# Patient Record
Sex: FEMALE | Race: WHITE
[De-identification: names, ages, dates, MRNs, and addresses within clinical notes are randomized per-mention and may not be internally consistent; named-entity substitution may affect disease eponyms.]

---

## 2021-02-02 NOTE — EDM.PDOC
ED HPI GENERAL MEDICAL PROBLEM





- General


Chief Complaint: Back Pain or Injury


Stated Complaint: TALKED TO NURSE


Time Seen by Provider: 02/02/21 12:28


Source of Information: Reports: Patient


History Limitations: Reports: No Limitations





- History of Present Illness


INITIAL COMMENTS - FREE TEXT/NARRATIVE: 





Patient presents reporting low back pain.  The patient states that she has a 

history of many chronic medical problems.  She is deconditioned and mostly sits 

because she has low back and knee pain when she is on her feet.  3 days ago she 

was shopping when she had an exacerbation of her chronic back pain, some low 

abdominal pressure, urinary urgency and hot and cold sweats.  This is typical 

for her but was worse than normal and the symptoms have not subsided in the 

interim.  


  ** back


Pain Score (Numeric/FACES): 5





- Related Data


                                    Allergies











Allergy/AdvReac Type Severity Reaction Status Date / Time


 


Penicillins Allergy  Hives Verified 02/02/21 12:30


 


tramadol [From Ultram] Allergy  Stomach Verified 02/02/21 12:30





   Ache  














Past Medical History


Cardiovascular History: Reports: Hypertension


Psychiatric History: Reports: Depression


Endocrine/Metabolic History: Reports: Diabetes, Type II





- Infectious Disease History


Infectious Disease History: Reports: Chicken Pox, Measles





- Past Surgical History


HEENT Surgical History: Reports: Oral Surgery





Social & Family History





- Tobacco Use


Tobacco Use Status *Q: Never Tobacco User





- Recreational Drug Use


Recreational Drug Use: Yes


Recreational Drug Type: Reports: Marijuana/Hashish





ED ROS GENERAL





- Review of Systems


Review Of Systems: Comprehensive ROS is negative, except as noted in HPI.





ED EXAM,LOWER BACK PAIN/INJURY





- Physical Exam


Exam: See Below


Exam Limited By: No Limitations


General Appearance: Alert, No Apparent Distress


Ears: Normal External Exam


Nose: Normal Inspection


Throat/Mouth: Normal Inspection


Head: Atraumatic, Normocephalic


Neck: Normal Inspection, Supple, Non-Tender


Respiratory/Chest: No Respiratory Distress, Lungs Clear, Normal Breath Sounds


Cardiovascular: Normal Peripheral Pulses, Regular Rate, Rhythm, No Edema


GI/Abdominal: Soft, No Distention, Other (Mild left lower quadrant)


Back Exam: Normal Inspection, Full Range of Motion, Other (No SI joint 

tenderness).  No: CVA Tenderness (L), CVA Tenderness (R), Paraspinal Tenderness,

Vertebral Tenderness


Extremities: Normal Inspection, Normal Range of Motion


Neurological: Alert, Oriented x 3


Psychiatric: Tearful (She states she taylor easily)


Skin Exam: Warm, Dry, Intact, Normal Color, No Rash


Lymphatic: No Adenopathy





Course





- Vital Signs


Last Recorded V/S: 


                                Last Vital Signs











Temp  36.8 C   02/02/21 12:29


 


Pulse  128 H  02/02/21 12:29


 


Resp  23 H  02/02/21 12:29


 


BP  134/73   02/02/21 12:29


 


Pulse Ox  95   02/02/21 12:29














- Orders/Labs/Meds


Orders: 


                               Active Orders 24 hr











 Category Date Time Status


 


 CMP [COMPREHENSIVE METABOLIC PN,CMP] [CHEM] Stat Lab  02/02/21 12:54 Ordered











Labs: 


                                Laboratory Tests











  02/02/21 02/02/21 Range/Units





  12:52 13:35 


 


WBC   22.66 H  (4.0-11.0)  K/uL


 


RBC   4.68  (4.30-5.90)  M/uL


 


Hgb   14.3  (12.0-16.0)  g/dL


 


Hct   43.0  (36.0-46.0)  %


 


MCV   91.9  (80.0-98.0)  fL


 


MCH   30.6  (27.0-32.0)  pg


 


MCHC   33.3  (31.0-37.0)  g/dL


 


RDW Std Deviation   45.5  (28.0-62.0)  fl


 


RDW Coeff of Job   14  (11.0-15.0)  %


 


Plt Count   260  (150-400)  K/uL


 


MPV   10.80  (7.40-12.00)  fL


 


Neut % (Auto)   86.4 H  (48.0-80.0)  %


 


Lymph % (Auto)   6.5 L  (16.0-40.0)  %


 


Mono % (Auto)   6.9  (0.0-15.0)  %


 


Eos % (Auto)   0.1  (0.0-7.0)  %


 


Baso % (Auto)   0.1  (0.0-1.5)  %


 


Neut # (Auto)   19.6 H  (1.4-5.7)  K/uL


 


Lymph # (Auto)   1.5  (0.6-2.4)  K/uL


 


Mono # (Auto)   1.6 H  (0.0-0.8)  K/uL


 


Eos # (Auto)   0.0  (0.0-0.7)  K/uL


 


Baso # (Auto)   0.0  (0.0-0.1)  K/uL


 


Nucleated RBC %   0.0  /100WBC


 


Nucleated RBCs #   0  K/uL


 


Urine Color  YELLOW   


 


Urine Appearance  CLOUDY   


 


Urine pH  6.0   (5.0-8.0)  


 


Ur Specific Gravity  >= 1.030   (1.001-1.035)  


 


Urine Protein  100 H   (NEGATIVE)  mg/dL


 


Urine Glucose (UA)  NEGATIVE   (NEGATIVE)  mg/dL


 


Urine Ketones  TRACE H   (NEGATIVE)  mg/dL


 


Urine Occult Blood  MODERATE H   (NEGATIVE)  


 


Urine Nitrite  POSITIVE H   (NEGATIVE)  


 


Urine Bilirubin  NEGATIVE   (NEGATIVE)  


 


Urine Urobilinogen  0.2   (<2.0)  EU/dL


 


Ur Leukocyte Esterase  MODERATE H   (NEGATIVE)  


 


Urine RBC  3-5   (0-2/HPF)  


 


Urine WBC  35-40   (0-5/HPF)  


 


Ur Epithelial Cells  FEW   (NONE-FEW)  


 


Urine Bacteria  3+ H   (NEGATIVE)  











Meds: 


Medications














Discontinued Medications














Generic Name Dose Route Start Last Admin





  Trade Name Freq  PRN Reason Stop Dose Admin


 


Ketorolac Tromethamine  60 mg  02/02/21 14:12 





  Toradol  IM  02/02/21 14:13 





  ONETIME ONE  














- Re-Assessments/Exams


Free Text/Narrative Re-Assessment/Exam: 





02/02/21 14:17


And states that she had a CT scan of the abdomen 3 to 4 months ago that indicate

d she had a kidney stone "with horns".





Departure





- Departure


Time of Disposition: 14:18


Disposition: Home, Self-Care 01


Condition: Good


Clinical Impression: 


 Renal calculi, Adnexal mass








- Discharge Information


Instructions:  Kidney Stones, Easy-to-Read


Referrals: 


Conchis Skaggs DO [Ordering Only Provider] - 


Amaya Celestin DO [Primary Care Provider] - 


Forms:  ED Department Discharge


Additional Instructions: 


The following information is given to patients seen in the emergency department 

who are being discharged to home. This information is to outline your options 

for follow-up care. We provide all patients seen in our emergency department 

with a follow-up referral.





The need for follow-up, as well as the timing and circumstances, are variable 

depending upon the specifics of your emergency department visit.





If you don't have a primary care physician on staff, we will provide you with a 

referral. We always advise you to contact your personal physician following an 

emergency department visit to inform them of the circumstance of the visit and 

for follow-up with them and/or the need for any referrals to a consulting specia

list.





The emergency department will also refer you to a specialist when appropriate. 

This referral assures that you have the opportunity for follow-up care with a 

specialist. All of these measure are taken in an effort to provide you with 

optimal care, which includes your follow-up.





Under all circumstances we always encourage you to contact your private 

physician who remains a resource for coordinating your care. When calling for 

follow-up care, please make the office aware that this follow-up is from your 

recent emergency room visit. If for any reason you are refused follow-up, please

contact the [facility] Emergency Department

at [phone] and asked to speak to the emergency department charge nurse.





1.  Take your Flomax once daily


2.  Ibuprofen 2-3 tabs 3 times daily with food as needed for pain


3.  Follow-up with Dr. Skaggs, Urology to discuss kidney stone management.  This

office has been alerted that you need an appointment.


4.  Follow up in family practice to evaluate pelvic mass





Sepsis Event Note (ED)





- Evaluation


Sepsis Screening Result: No Definite Risk





- Focused Exam


Vital Signs: 


                                   Vital Signs











  Temp Pulse Resp BP Pulse Ox


 


 02/02/21 12:29  36.8 C  128 H  23 H  134/73  95














- My Orders


Last 24 Hours: 


My Active Orders





02/02/21 12:54


CMP [COMPREHENSIVE METABOLIC PN,CMP] [CHEM] Stat 














- Assessment/Plan


Last 24 Hours: 


My Active Orders





02/02/21 12:54


CMP [COMPREHENSIVE METABOLIC PN,CMP] [CHEM] Stat

## 2021-02-02 NOTE — CT
INDICATION:



Abdominal and back pain 



TECHNIQUE:



CT abdomen and pelvis acquired without IV contrast.



COMPARISON:



None 



FINDINGS:



Lower chest: Small hiatal hernia. 



Liver: Unremarkable.  



Spleen: Unremarkable.  



Pancreas: Unremarkable.  



Gallbladder and bile ducts: Unremarkable.  



Adrenal glands: Unremarkable.  



Kidneys: There are 2 mm stones adjacent to the left ureterovesical 

junction. It is difficult to determine if these are within or external to 

the ureter. There is no hydroureter but there is moderate hydronephrosis 

secondary to a 2.3 cm stone in the left renal pelvis. At least 3 smaller 

stones are also seen in the left kidney. There is mild fat stranding around 

the left kidney. There are 4 nonobstructive stones in the right kidney, the 

largest measuring 6 mm. 



GI tract: Unremarkable.  Appendix is normal.  



Vascular structures: Unremarkable.  



Lymph nodes: Unremarkable.  



Miscellaneous: Unremarkable.  No free air or significant free fluid.  



Pelvic Organs: 2.6 cm cystic lesion in the left adnexa. 



Bones: Unremarkable for age.  



IMPRESSION:



There are 2 mm stones adjacent to the left ureterovesical junction. It is 

difficult to determine if these are within or external to the ureter. There 

is no hydroureter but there is moderate hydronephrosis secondary to a 2.3 

cm stone in the left renal pelvis.



Additional stones are seen in both kidneys.



Cystic lesion in the left adnexa. If the patient is postmenopausal, a 

pelvic ultrasound is recommended for further characterization. 



Small hiatal hernia. 



These findings were discussed with Steff Grove NP at 1:53 p.m.  on 

February 2, 2021.



Please note that all CT scans at this facility use dose modulation, 

iterative reconstruction, and/or weight-based dosing when appropriate to 

reduce radiation dose to as low as reasonably achievable.



Dictated by Mikki Dias MD @ Feb 2 2021  1:56PM



Signed by Dr. Mikki Dias @ Feb 2 2021  1:56PM

## 2021-08-17 ENCOUNTER — HOSPITAL ENCOUNTER (EMERGENCY)
Dept: HOSPITAL 56 - MW.ED | Age: 51
Discharge: HOME | End: 2021-08-17
Payer: MEDICAID

## 2021-08-17 DIAGNOSIS — S89.92XA: Primary | ICD-10-CM

## 2021-08-17 DIAGNOSIS — M19.90: ICD-10-CM

## 2021-08-17 DIAGNOSIS — G89.29: ICD-10-CM

## 2021-08-17 DIAGNOSIS — E11.9: ICD-10-CM

## 2021-08-17 DIAGNOSIS — M54.5: ICD-10-CM

## 2021-08-17 DIAGNOSIS — I10: ICD-10-CM
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
## 2021-08-17 DIAGNOSIS — Z88.0: ICD-10-CM

## 2021-08-17 PROCEDURE — 73562 X-RAY EXAM OF KNEE 3: CPT

## 2021-08-17 PROCEDURE — 96372 THER/PROPH/DIAG INJ SC/IM: CPT

## 2021-08-17 PROCEDURE — 99283 EMERGENCY DEPT VISIT LOW MDM: CPT

## 2021-08-17 PROCEDURE — 72170 X-RAY EXAM OF PELVIS: CPT
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
## 2021-08-17 NOTE — CR
INDICATION: Patient fall, left knee and low back pelvis pain



TECHNIQUE: Pelvis radiograph 1 view 



COMPARISON: None



FINDINGS: 



Bone: No acute fractures or aggressive bone lesions are identified. 



Joint: The hip joints are unremarkable. The visualized sacroiliac joints 

are unremarkable in appearance. The pubic symphysis is normal in 

appearance. 



Soft tissue: Unremarkable. The visualized bowel gas pattern of the pelvis 

is unremarkable in appearance. No radiopaque foreign bodies are seen. 



IMPRESSION: 



1. No acute osseous injuries or abnormalities are noted. 



Dictated by: Tomas Foster MD @ 08/17/2021 19:34:25



(Electronically Signed)

## 2021-08-17 NOTE — CR
INDICATION: Patient fall, left knee and low back pain



TECHNIQUE: Knee radiograph 3 views left



COMPARISON: None



FINDINGS: 



Bone: No acute fractures or aggressive bone lesions are identified. 



Joint: The medial compartment has mild osteoarthritis. The joint spaces of 

the lateral and patellofemoral compartments are unremarkable. No 

significant knee effusion is seen. 



Soft tissue: Unremarkable. No radiopaque foreign bodies are seen. 



IMPRESSION: 



1. No acute osseous injuries or abnormalities are noted. 



Dictated by: Tomas Foster MD @ 08/17/2021 19:34:01



(Electronically Signed)

## 2022-06-07 ENCOUNTER — HOSPITAL ENCOUNTER (OUTPATIENT)
Dept: HOSPITAL 56 - MW.SDS | Age: 52
Discharge: HOME | End: 2022-06-07
Attending: SURGERY
Payer: MEDICAID

## 2022-06-07 DIAGNOSIS — I10: ICD-10-CM

## 2022-06-07 DIAGNOSIS — G47.30: ICD-10-CM

## 2022-06-07 DIAGNOSIS — M17.0: ICD-10-CM

## 2022-06-07 DIAGNOSIS — E11.9: ICD-10-CM

## 2022-06-07 DIAGNOSIS — K29.50: ICD-10-CM

## 2022-06-07 DIAGNOSIS — Z98.890: ICD-10-CM

## 2022-06-07 DIAGNOSIS — Z12.11: Primary | ICD-10-CM

## 2022-06-07 DIAGNOSIS — Z87.891: ICD-10-CM

## 2022-06-07 DIAGNOSIS — Z79.899: ICD-10-CM

## 2022-06-07 DIAGNOSIS — R13.10: ICD-10-CM

## 2022-06-07 DIAGNOSIS — E66.01: ICD-10-CM

## 2022-06-07 DIAGNOSIS — Z91.030: ICD-10-CM

## 2022-06-07 DIAGNOSIS — Z88.0: ICD-10-CM

## 2022-06-07 DIAGNOSIS — E78.00: ICD-10-CM

## 2022-06-07 DIAGNOSIS — K44.9: ICD-10-CM

## 2022-06-07 DIAGNOSIS — E78.5: ICD-10-CM

## 2022-06-07 DIAGNOSIS — K21.9: ICD-10-CM

## 2022-06-07 PROCEDURE — 43239 EGD BIOPSY SINGLE/MULTIPLE: CPT

## 2022-06-07 PROCEDURE — 45378 DIAGNOSTIC COLONOSCOPY: CPT

## 2022-06-07 PROCEDURE — 82947 ASSAY GLUCOSE BLOOD QUANT: CPT

## 2025-03-27 ENCOUNTER — HOSPITAL ENCOUNTER (EMERGENCY)
Dept: HOSPITAL 56 - MW.ED | Age: 55
LOS: 1 days | Discharge: HOME | End: 2025-03-28
Payer: COMMERCIAL

## 2025-03-27 DIAGNOSIS — Z75.8: ICD-10-CM

## 2025-03-27 DIAGNOSIS — N20.0: Primary | ICD-10-CM

## 2025-03-27 DIAGNOSIS — E66.9: ICD-10-CM

## 2025-03-27 DIAGNOSIS — Z79.899: ICD-10-CM

## 2025-03-27 DIAGNOSIS — E11.9: ICD-10-CM

## 2025-03-27 DIAGNOSIS — E78.00: ICD-10-CM

## 2025-03-27 DIAGNOSIS — K21.9: ICD-10-CM

## 2025-03-27 DIAGNOSIS — N30.01: ICD-10-CM

## 2025-03-27 DIAGNOSIS — I10: ICD-10-CM

## 2025-03-27 DIAGNOSIS — Z79.84: ICD-10-CM

## 2025-03-27 DIAGNOSIS — Z91.030: ICD-10-CM

## 2025-03-27 DIAGNOSIS — Z88.0: ICD-10-CM

## 2025-03-27 DIAGNOSIS — Z79.51: ICD-10-CM

## 2025-03-27 LAB
ALBUMIN SERPL-MCNC: 3.3 G/DL (ref 3.4–5)
ALBUMIN/GLOB SERPL: 0.8 {RATIO} (ref 0.9–1.6)
ALP SERPL-CCNC: 99 U/L (ref 46–116)
ALT SERPL-CCNC: 14 IU/L (ref 14–63)
APPEARANCE UR: (no result)
AST SERPL-CCNC: 13 IU/L (ref 15–37)
BACTERIA URNS QL MICRO: (no result)
BASOPHILS # BLD AUTO: 0.04 K/UL (ref 0–0.2)
BASOPHILS NFR BLD AUTO: 0.3 % (ref 0–1)
BILIRUB SERPL-MCNC: 0.3 MG/DL (ref 0.2–1)
BILIRUB UR STRIP-MCNC: NEGATIVE MG/DL
BUN SERPL-MCNC: 16 MG/DL (ref 7–18)
CALCIUM SERPL-MCNC: 9.3 MG/DL (ref 8.5–10.1)
CHLORIDE SERPL-SCNC: 101 MMOL/L (ref 98–107)
CO2 SERPL-SCNC: 26.6 MMOL/L (ref 21–32)
COLOR UR: YELLOW
CREAT CL 24H UR+SERPL-VRATE: 53.76 ML/MIN
CREAT SERPL-MCNC: 1.2 MG/DL (ref 0.6–1)
EGFRCR SERPLBLD CKD-EPI 2021: 53 ML/MIN (ref 60–?)
EOSINOPHIL # BLD AUTO: 0.24 K/UL (ref 0–0.45)
EOSINOPHIL NFR BLD AUTO: 1.7 % (ref 0–6)
EPI CELLS #/AREA URNS HPF: (no result) /[HPF]
GLOBULIN SER-MCNC: 4.4 G/DL (ref 2.6–4)
GLUCOSE SERPL-MCNC: 129 MG/DL (ref 74–106)
GLUCOSE UR STRIP-MCNC: NEGATIVE MG/DL
HCT VFR BLD AUTO: 34.8 % (ref 37–47)
HGB BLD-MCNC: 11.3 G/DL (ref 12–16)
IMM GRANULOCYTES # BLD: 0.07 K/UL (ref 0–0.05)
IMM GRANULOCYTES NFR BLD: 0.5 % (ref 0–0.4)
KETONES UR STRIP-MCNC: (no result) MG/DL
LIPASE SERPL-CCNC: 22 U/L (ref 16–77)
LYMPHOCYTES # BLD AUTO: 2.82 K/UL (ref 1–4.8)
LYMPHOCYTES NFR BLD AUTO: 19.8 % (ref 24–44)
MCH RBC QN AUTO: 26.5 PG (ref 28–32)
MCHC RBC AUTO-ENTMCNC: 32.5 G/DL (ref 32–36)
MCHC RBC AUTO-ENTMCNC: 81.5 FL (ref 83–99)
MONOCYTES # BLD AUTO: 0.76 K/UL (ref 0–0.8)
MONOCYTES NFR BLD AUTO: 5.3 % (ref 0–8)
NEUTROPHILS # BLD AUTO: 10.31 K/UL (ref 1.8–7.7)
NEUTROPHILS NFR BLD AUTO: 72.4 % (ref 41–71)
NITRITE UR QL: NEGATIVE
NRBC BLD AUTO-RTO: 0 /100WBC (ref 0–0.2)
NRBC BLD AUTO-RTO: 0 K/UL (ref 0–0.02)
PH UR STRIP: 8 [PH] (ref 5–8)
PLATELET # BLD AUTO: 430 K/UL (ref 150–400)
PMV BLD AUTO: 9.6 FL (ref 9.4–12.3)
POTASSIUM SERPL-SCNC: 3.4 MMOL/L (ref 3.5–5.1)
PROT SERPL-MCNC: 7.7 G/DL (ref 6.4–8.2)
PROT UR STRIP-MCNC: 100 MG/DL
RBC # BLD AUTO: 4.27 M/UL (ref 4.1–5.3)
RBC # URNS HPF: (no result) /ML
RBC UR QL: (no result)
SODIUM SERPL-SCNC: 137 MMOL/L (ref 136–145)
SP GR UR STRIP: 1.01 (ref 1–1.03)
UROBILINOGEN UR STRIP-ACNC: 0.2 EU/DL (ref ?–2)
WBC # BLD AUTO: 14.24 K/UL (ref 3.9–11.3)
WBC UR QL: (no result)

## 2025-03-27 PROCEDURE — 85025 COMPLETE CBC W/AUTO DIFF WBC: CPT

## 2025-03-27 PROCEDURE — 96375 TX/PRO/DX INJ NEW DRUG ADDON: CPT

## 2025-03-27 PROCEDURE — 99284 EMERGENCY DEPT VISIT MOD MDM: CPT

## 2025-03-27 PROCEDURE — 71046 X-RAY EXAM CHEST 2 VIEWS: CPT

## 2025-03-27 PROCEDURE — 81001 URINALYSIS AUTO W/SCOPE: CPT

## 2025-03-27 PROCEDURE — 83690 ASSAY OF LIPASE: CPT

## 2025-03-27 PROCEDURE — 80053 COMPREHEN METABOLIC PANEL: CPT

## 2025-03-27 PROCEDURE — 36415 COLL VENOUS BLD VENIPUNCTURE: CPT

## 2025-03-27 PROCEDURE — 87086 URINE CULTURE/COLONY COUNT: CPT

## 2025-03-27 PROCEDURE — 96365 THER/PROPH/DIAG IV INF INIT: CPT

## 2025-03-27 PROCEDURE — 93005 ELECTROCARDIOGRAM TRACING: CPT

## 2025-03-27 PROCEDURE — 74176 CT ABD & PELVIS W/O CONTRAST: CPT

## 2025-03-27 PROCEDURE — 84484 ASSAY OF TROPONIN QUANT: CPT

## 2025-03-28 RX ADMIN — KETOROLAC TROMETHAMINE ONE MG: 30 INJECTION, SOLUTION INTRAMUSCULAR at 01:18

## 2025-03-28 RX ADMIN — HYDROCODONE BITARTRATE AND ACETAMINOPHEN ONE TAB: 10; 325 TABLET ORAL at 01:18
